# Patient Record
Sex: FEMALE | Race: WHITE | Employment: FULL TIME
[De-identification: names, ages, dates, MRNs, and addresses within clinical notes are randomized per-mention and may not be internally consistent; named-entity substitution may affect disease eponyms.]

---

## 2023-05-31 ENCOUNTER — NURSE TRIAGE (OUTPATIENT)
Dept: OTHER | Facility: CLINIC | Age: 28
End: 2023-05-31

## 2023-05-31 NOTE — TELEPHONE ENCOUNTER
Location of patient: [address] [place] [address]    Received call from [address] at [facility]; Patient with [employer] Complaint requesting to establish care with [facility]. Subjective: Caller states \"I was recently dx with gonorrhea and chlamydia and had an injection (5/25) I have been to the ER, had Ultrasounds and CT scans and was told no immediate surgery is needed. \"     Current Symptoms: Painful/heavy periods; spotting before periods (clots); pelvic pain - sharp shooting (current)    Denies any N/V, urinary symptoms, vaginal bleeding, fever, or diarrhea. Onset: 5 years ago; sudden, worsening    Associated Symptoms: reduced activity; constipation    Pain Severity: 2/10; dull, aching; constant; worse with movement, 8/10    Temperature: denies fever or chills    What has been tried: antibiotic injection; doxycycline; metronidazole; Advil gel capsules    LMP:  5/6/2023  Pregnant: No    Recommended disposition: See PCP within 24 Hours; if unable to be seen within 24 hours, instructed to go to [facility], ER (as a last resort)    Care advice provided, patient verbalizes understanding; denies any other questions or concerns; instructed to call back for any new or worsening symptoms. Patient/Caller agrees with recommended disposition; writer provided warm transfer to  [facility] for appointment scheduling    Attention Provider: Thank you for allowing me to participate in the care of your patient. The patient was connected to triage in response to information provided to the [facility]. Please do not respond through this encounter as the response is not directed to a shared pool.     Reason for Disposition   [1] MODERATE (e.g., interferes with normal activities) pelvic pain AND [2] pain comes and goes (cramps) AND [3] present > 24 hours    Protocols used: Pelvic Pain - Female-ADULT-